# Patient Record
Sex: FEMALE
[De-identification: names, ages, dates, MRNs, and addresses within clinical notes are randomized per-mention and may not be internally consistent; named-entity substitution may affect disease eponyms.]

---

## 2018-01-29 ENCOUNTER — NON-APPOINTMENT (OUTPATIENT)
Age: 68
End: 2018-01-29

## 2018-01-29 ENCOUNTER — APPOINTMENT (OUTPATIENT)
Dept: FAMILY MEDICINE | Facility: CLINIC | Age: 68
End: 2018-01-29
Payer: COMMERCIAL

## 2018-01-29 VITALS
WEIGHT: 200 LBS | DIASTOLIC BLOOD PRESSURE: 86 MMHG | TEMPERATURE: 98.2 F | SYSTOLIC BLOOD PRESSURE: 148 MMHG | HEIGHT: 65 IN | BODY MASS INDEX: 33.32 KG/M2 | HEART RATE: 94 BPM | OXYGEN SATURATION: 97 %

## 2018-01-29 DIAGNOSIS — B18.2 CHRONIC VIRAL HEPATITIS C: ICD-10-CM

## 2018-01-29 DIAGNOSIS — Z23 ENCOUNTER FOR IMMUNIZATION: ICD-10-CM

## 2018-01-29 DIAGNOSIS — Z00.00 ENCOUNTER FOR GENERAL ADULT MEDICAL EXAMINATION W/OUT ABNORMAL FINDINGS: ICD-10-CM

## 2018-01-29 DIAGNOSIS — M89.8X1 OTHER SPECIFIED DISORDERS OF BONE, SHOULDER: ICD-10-CM

## 2018-01-29 DIAGNOSIS — R94.31 ABNORMAL ELECTROCARDIOGRAM [ECG] [EKG]: ICD-10-CM

## 2018-01-29 DIAGNOSIS — Z78.9 OTHER SPECIFIED HEALTH STATUS: ICD-10-CM

## 2018-01-29 DIAGNOSIS — R03.0 ELEVATED BLOOD-PRESSURE READING, W/OUT DIAGNOSIS OF HYPERTENSION: ICD-10-CM

## 2018-01-29 DIAGNOSIS — D05.81 OTHER SPECIFIED TYPE OF CARCINOMA IN SITU OF RIGHT BREAST: ICD-10-CM

## 2018-01-29 PROCEDURE — 99213 OFFICE O/P EST LOW 20 MIN: CPT | Mod: 25

## 2018-01-29 PROCEDURE — 99387 INIT PM E/M NEW PAT 65+ YRS: CPT | Mod: 25

## 2018-01-29 PROCEDURE — 93000 ELECTROCARDIOGRAM COMPLETE: CPT

## 2018-01-29 RX ORDER — LATANOPROST/PF 0.005 %
0.01 DROPS OPHTHALMIC (EYE) DAILY
Refills: 0 | Status: ACTIVE | COMMUNITY
Start: 2018-01-29

## 2018-01-29 RX ORDER — POLYETHYLENE GLYCOL-3350, SODIUM CHLORIDE, POTASSIUM CHLORIDE AND SODIUM BICARBONATE 420; 11.2; 5.72; 1.48 G/438.4G; G/438.4G; G/438.4G; G/438.4G
420 POWDER, FOR SOLUTION ORAL
Qty: 4000 | Refills: 0 | Status: DISCONTINUED | COMMUNITY
Start: 2017-09-14

## 2018-03-21 ENCOUNTER — TRANSCRIPTION ENCOUNTER (OUTPATIENT)
Age: 68
End: 2018-03-21

## 2018-03-22 ENCOUNTER — MOBILE ON CALL (OUTPATIENT)
Age: 68
End: 2018-03-22

## 2018-03-22 DIAGNOSIS — E55.9 VITAMIN D DEFICIENCY, UNSPECIFIED: ICD-10-CM

## 2018-03-22 DIAGNOSIS — R73.03 PREDIABETES.: ICD-10-CM

## 2018-03-22 DIAGNOSIS — E78.5 HYPERLIPIDEMIA, UNSPECIFIED: ICD-10-CM

## 2022-12-13 ENCOUNTER — TRANSCRIPTION ENCOUNTER (OUTPATIENT)
Age: 72
End: 2022-12-13

## 2022-12-13 ENCOUNTER — NON-APPOINTMENT (OUTPATIENT)
Age: 72
End: 2022-12-13

## 2022-12-15 ENCOUNTER — APPOINTMENT (OUTPATIENT)
Dept: OTOLARYNGOLOGY | Facility: CLINIC | Age: 72
End: 2022-12-15
Payer: MEDICARE

## 2022-12-15 ENCOUNTER — NON-APPOINTMENT (OUTPATIENT)
Age: 72
End: 2022-12-15

## 2022-12-15 VITALS
DIASTOLIC BLOOD PRESSURE: 92 MMHG | TEMPERATURE: 97 F | HEIGHT: 65 IN | HEART RATE: 82 BPM | BODY MASS INDEX: 33.32 KG/M2 | WEIGHT: 200 LBS | SYSTOLIC BLOOD PRESSURE: 140 MMHG

## 2022-12-15 DIAGNOSIS — Z86.19 PERSONAL HISTORY OF OTHER INFECTIOUS AND PARASITIC DISEASES: ICD-10-CM

## 2022-12-15 DIAGNOSIS — Z85.3 PERSONAL HISTORY OF MALIGNANT NEOPLASM OF BREAST: ICD-10-CM

## 2022-12-15 DIAGNOSIS — R58 HEMORRHAGE, NOT ELSEWHERE CLASSIFIED: ICD-10-CM

## 2022-12-15 DIAGNOSIS — Z86.79 PERSONAL HISTORY OF OTHER DISEASES OF THE CIRCULATORY SYSTEM: ICD-10-CM

## 2022-12-15 DIAGNOSIS — Z86.39 PERSONAL HISTORY OF OTHER ENDOCRINE, NUTRITIONAL AND METABOLIC DISEASE: ICD-10-CM

## 2022-12-15 PROCEDURE — 99204 OFFICE O/P NEW MOD 45 MIN: CPT

## 2022-12-15 RX ORDER — AMLODIPINE BESYLATE 5 MG/1
5 TABLET ORAL
Refills: 0 | Status: ACTIVE | COMMUNITY

## 2023-01-25 ENCOUNTER — APPOINTMENT (OUTPATIENT)
Dept: OTOLARYNGOLOGY | Facility: CLINIC | Age: 73
End: 2023-01-25
Payer: MEDICARE

## 2023-01-25 VITALS
HEIGHT: 65 IN | BODY MASS INDEX: 32.49 KG/M2 | SYSTOLIC BLOOD PRESSURE: 154 MMHG | DIASTOLIC BLOOD PRESSURE: 87 MMHG | TEMPERATURE: 97.5 F | WEIGHT: 195 LBS

## 2023-01-25 PROBLEM — Z86.39 HISTORY OF HYPERPARATHYROIDISM: Status: RESOLVED | Noted: 2022-12-15 | Resolved: 2023-01-25

## 2023-01-25 PROCEDURE — 99214 OFFICE O/P EST MOD 30 MIN: CPT

## 2023-01-25 NOTE — ASSESSMENT
[FreeTextEntry1] : Ms. COLES is a 71 yo woman with primary hyperparathyroidism and hypercalcemia - Ca 11 and intact .\par She has nocturnal urinary frequency and bone pain in her legs.\par She gives history of significant bleeding after birth of her 1st child and after mastectomy.  Blood transfusion resulted in Hep C infection.  She reports chronic anemia.\par \par I recommended neck exploration and parathyroidectomy.\par We discussed the neck exploration and parathyroidectomy procedure, along with risks benefits and alternatives. Risks include, but are not limited to, bleeding infection, hypocalcemia, persistent hypercalcemia, voice change, vocal fold paresis/paralysis and cervical scar.  Her questions were answered. \par \par --> US Parathyroid\par --> DEXA scan\par --> hematology evaluation\par \par Return after imaging to schedule surgery

## 2023-01-25 NOTE — CONSULT LETTER
[Dear  ___] : Dear  [unfilled], [( Thank you for referring [unfilled] for consultation for _____ )] : Thank you for referring [unfilled] for consultation for [unfilled] [Please see my note below.] : Please see my note below. [Consult Closing:] : Thank you very much for allowing me to participate in the care of this patient.  If you have any questions, please do not hesitate to contact me. [Sincerely,] : Sincerely, [FreeTextEntry2] : Danay Dudley MD\par [address] [place] [address], [address]\par [place] [id number] [FreeTextEntry3] : \par Mattie Herrera MD \par Otolaryngology, Head and Neck Surgery \par \par

## 2023-01-25 NOTE — PHYSICAL EXAM
[Midline] : trachea located in midline position [Normal] : no rashes [FreeTextEntry1] : No hoarseness.  [Laryngoscopy Performed] : laryngoscopy was performed, see procedure section for findings [de-identified] : tonsils cryptic with debris [de-identified] : Carotid pulses 2+ bilateral.

## 2023-01-25 NOTE — HISTORY OF PRESENT ILLNESS
[de-identified] : I was pleased to evaluate Ms. COLES, a 72 year old woman who was referred by Dr. Dudley for hypercalcemia and primary hyperparathyroidism.  \par PMH: HTN, breast CA (s/p right mastectomy 20 years, no RT or chemo), hx hepatitis C\par \par She noticed her calcium levels have been high for a few years. \par She had a swollen painful knee a few months ago. Now she has bone pain in her upper legs.\par She frequently uses the bathroom at night. She feels sluggish sometimes but no fatigue.\par No kidney stones, fractures, abdominal pain, constipation.\par Last DEXA years ago was normal, per her report. \par Limited known family history, but no known hypercalcemia or hyperparathyroidism.\par She has issues with bleeding, she hemorrhaged after delivering her first child. She needed a blood transfusion and later developed Hepatitis C. She had a hematology workup prior to mastectomy 20 years ago. She had issues with bleeding after her mastectomy. Hemoglobin is always low. She had dental bleeding after a procedure as a child also.\par No issues with stopping bleeding if she cuts herself.\par \par \par LABS\par (07/30/2022) -- Ca 11, intact \par (07/29/2022) -- Ca 10.8, Cr 0.87, Alb 4.2, Vit D total 23\par (02/03/2018) -- Ca 10.6, vit D total 14 \par \par \par NUCLEAR MEDICINE PARATHYROID IMAGING (08-) at [facility]\par - COMPARISON:  None.\par * 20 MINUTE EARLY IMAGES: \par There is uniform distribution of radiotracer throughout both thyroid lobes. There is focal radiotracer activity upper pole of the left thyroid lobe. Physiologic radiotracer activity is noted in salivary glands. \par *DELAY THREE HOUR IMAGES: \par There is complete washout of the radiotracer from both thyroid lobes. Delay washout of the focal radiotracer activity is appreciated in upper pole of the left thyroid lobe within the region of the parathyroid bed. There is no abnormal radiotracer activity appreciated within the mediastinum or neck to suggest ectopic functioning parathyroid tissue. \par IMPRESSION: \par Abnormal parathyroid scan. Scintigraphic evidence of a parathyroid adenoma upper pole of the left thyroid lobe.\par (Images were reviewed.) \par \par \par US THYROID U/S (11-) at [facility]\par - COMPARISON:  None\par - RIGHT LOBE: 4.7 x 1.2 x 1.2 cm \par     --- 2 lower pole right cysts.\par - LEFT LOBE: 4.3 x 1.3 x 1.6 cm\par - ISTHMUS: 0.3 cm, with  tiny left mid cyst \par - No suspicious cervical lymph nodes.\par \par

## 2023-01-25 NOTE — PROCEDURE
[de-identified] : \par Indication: hyperparathyroidism\par -Verbal consent was obtained from patient prior to procedure.\par Flexible laryngoscopy was performed via mouth and revealed the following:\par   -- bilateral cryptic tonsils with debris\par   -- Base of tongue was symmetric and not enlarged.\par   -- Vallecula was clear\par   -- Epiglottis, both aryepiglottic folds and both false vocal folds were normal\par   -- Arytenoids both without edema and erythema \par   -- True vocal folds were fully mobile and without lesions. \par   -- Post cricoid area was clear.\par   -- Interarytenoid edema was absent \par \par The patient tolerated the procedure well.\par

## 2023-01-25 NOTE — REVIEW OF SYSTEMS
[Patient Intake Form Reviewed] : Patient intake form was reviewed [As Noted in HPI] : as noted in HPI [Negative] : Genitourinary

## 2023-01-26 NOTE — ASSESSMENT
[FreeTextEntry1] : Ms. AL is a 73 yo woman with primary hyperparathyroidism and hypercalcemia - Ca 11 and intact .\par She has nocturnal urinary frequency and bone pain in her legs.\par She gives history of significant bleeding after birth of her 1st child and after mastectomy.  Blood transfusion resulted in Hep C infection.  She reports chronic anemia.\par \par I recommended neck exploration and parathyroidectomy.\par We discussed the neck exploration and parathyroidectomy procedure, along with risks benefits and alternatives. Risks include, but are not limited to, bleeding infection, hypocalcemia, persistent hypercalcemia, voice change, vocal fold paresis/paralysis and cervical scar.  Her questions were answered. \par Parathyroidectomy OR date has not been scheduled yet but will be done at [facility]. \par Medical evaluation and clearance by JAYASHREE JUNG  would be greatly appreciated.\par Preoperative Hematology evaluation would also be appreciated.\par \par Ms. Al received preoperative instructions and a surgical packet today.  She will contact my office at least 1 month prior to her requested surgical date.

## 2023-01-26 NOTE — HISTORY OF PRESENT ILLNESS
[de-identified] : PMH: HTN, breast CA (s/p right mastectomy 20 years, no RT or chemo), hx hepatitis C\par \par Ms. COLES was seen in f/up for primary hyperparathyroidism.  \par She had new DEXA scan and parathyroid US to confirm localization.\par No change in sx.\par \par INITIAL VISIT 12/15/2022\par Ms. COLES, a 72 year old woman who was referred by Dr. Dudley for hypercalcemia and primary hyperparathyroidism.  \par She noticed her calcium levels have been high for a few years. \par She had a swollen painful knee a few months ago. Now she has bone pain in her upper legs.\par She frequently uses the bathroom at night. She feels sluggish sometimes but no fatigue.\par No kidney stones, fractures, abdominal pain, constipation.\par Last DEXA years ago was normal, per her report. \par Limited known family history, but no known hypercalcemia or hyperparathyroidism.\par She has issues with bleeding, she hemorrhaged after delivering her first child. She needed a blood transfusion and later developed Hepatitis C. She had a hematology workup prior to mastectomy 20 years ago. She had issues with bleeding after her mastectomy. Hemoglobin is always low. She had dental bleeding after a procedure as a child also.\par No issues with stopping bleeding if she cuts herself.\par \par \par LABS\par (07/30/2022) -- Ca 11, intact \par (07/29/2022) -- Ca 10.8, Cr 0.87, Alb 4.2, Vit D total 23\par (02/03/2018) -- Ca 10.6, vit D total 14 \par \par  \par ULTRASOUND THYROID NECK (01-) at [facility]\par - COMPARISON:  Thyroid ultrasound dated November 21, 2022\par - Overall thyroid echotexture and blood flow: Slightly heterogeneous slightly hypervascular thyroid is noted.\par - ISTHMUS:   0.2 cm.\par - RIGHT LOBE:  5.5 x 1.3 x 1.4 cm\par    --- 0.5 x 0.2 x 0.4 cm complex cyst in lower pole, stable\par    --- 0.5 x 0.2 x 0.3 cm complex cyst with echogenic foci suggestive of a colloid cyst in midpole \par - LEFT LOBE: 5.2 x 1.1 x 1.4 cm\par    --- 0.2 x 0.1 x 0.2 cm complex cyst in midpole; measured 0.3 cm on prior exam\par - PARATHYROID:  In the region of the left parathyroid gland, posterior upper/midpole of the left thyroid, a 1.0 x 0.6 x 0.7 cm hypoechoic mass is seen similar to prior exam suggestive of a parathyroid adenoma\par - No suspicious cervical lymph nodes.\par IMPRESSION:\par 1.) Slightly heterogeneous slightly hypervascular thyroid which can be seen in thyroiditis. Correlation with serology is recommended. Benign subcentimeter complex bilateral thyroid cysts for which no further follow-up is required as per ACR BI-RADS category\par 2.) 1 cm hypoechoic nodule posterior to the upper/midpole left thyroid suggestive of a parathyroid lesion/parathyroid adenoma consistent with nuclear medicine parathyroid imaging scan dated August 31, 2022.\par \par \par NUCLEAR MEDICINE PARATHYROID IMAGING (08-) at [facility]\par - COMPARISON:  None.\par * 20 MINUTE EARLY IMAGES: \par There is uniform distribution of radiotracer throughout both thyroid lobes. There is focal radiotracer activity upper pole of the left thyroid lobe. Physiologic radiotracer activity is noted in salivary glands. \par *DELAY THREE HOUR IMAGES: \par There is complete washout of the radiotracer from both thyroid lobes. Delay washout of the focal radiotracer activity is appreciated in upper pole of the left thyroid lobe within the region of the parathyroid bed. There is no abnormal radiotracer activity appreciated within the mediastinum or neck to suggest ectopic functioning parathyroid tissue. \par IMPRESSION: \par Abnormal parathyroid scan. Scintigraphic evidence of a parathyroid adenoma upper pole of the left thyroid lobe.\par \par \par DEXA AXIAL (01-)  at [facility]:\par - Normal bone mass\par  \par \par US THYROID U/S (11-) at [facility]\par - COMPARISON:  None\par - RIGHT LOBE: 4.7 x 1.2 x 1.2 cm \par     --- 2 lower pole right cysts.\par - LEFT LOBE: 4.3 x 1.3 x 1.6 cm\par - ISTHMUS: 0.3 cm, with  tiny left mid cyst \par - No suspicious cervical lymph nodes.\par \par

## 2023-01-26 NOTE — CONSULT LETTER
[Dear  ___] : Dear  [unfilled], [Courtesy Letter:] : I had the pleasure of seeing your patient, [unfilled], in my office today. [Please see my note below.] : Please see my note below. [Consult Closing:] : Thank you very much for allowing me to participate in the care of this patient.  If you have any questions, please do not hesitate to contact me. [Sincerely,] : Sincerely, [FreeTextEntry2] : Danay Dudley MD\par [address] [place] [address], [address]\par [place] [id number] [FreeTextEntry3] : \par Mattie Herrera MD \par Otolaryngology, Head and Neck Surgery \par \par   [___] : [unfilled]

## 2023-07-26 ENCOUNTER — APPOINTMENT (OUTPATIENT)
Dept: OTOLARYNGOLOGY | Facility: CLINIC | Age: 73
End: 2023-07-26
Payer: MEDICARE

## 2023-07-26 VITALS
DIASTOLIC BLOOD PRESSURE: 81 MMHG | WEIGHT: 193.6 LBS | BODY MASS INDEX: 32.26 KG/M2 | HEIGHT: 65 IN | TEMPERATURE: 96.9 F | HEART RATE: 71 BPM | SYSTOLIC BLOOD PRESSURE: 152 MMHG

## 2023-07-26 DIAGNOSIS — Z91.013 ALLERGY TO SEAFOOD: ICD-10-CM

## 2023-07-26 DIAGNOSIS — M89.8X9 OTHER SPECIFIED DISORDERS OF BONE, UNSPECIFIED SITE: ICD-10-CM

## 2023-07-26 DIAGNOSIS — D64.9 ANEMIA, UNSPECIFIED: ICD-10-CM

## 2023-07-26 PROCEDURE — 99214 OFFICE O/P EST MOD 30 MIN: CPT

## 2023-07-27 PROBLEM — M89.8X9 BONE PAIN: Status: ACTIVE | Noted: 2023-01-25

## 2023-07-27 PROBLEM — Z91.013 HISTORY OF ALLERGY TO SHELLFISH: Status: RESOLVED | Noted: 2023-07-27 | Resolved: 2023-07-27

## 2023-07-27 PROBLEM — D64.9 CHRONIC ANEMIA: Status: ACTIVE | Noted: 2023-07-27

## 2023-07-27 NOTE — PHYSICAL EXAM
[Midline] : trachea located in midline position [Normal] : no neck adenopathy [FreeTextEntry1] : No hoarseness.

## 2023-07-27 NOTE — CONSULT LETTER
[Dear  ___] : Dear  [unfilled], [Courtesy Letter:] : I had the pleasure of seeing your patient, [unfilled], in my office today. [Please see my note below.] : Please see my note below. [Consult Closing:] : Thank you very much for allowing me to participate in the care of this patient.  If you have any questions, please do not hesitate to contact me. [Sincerely,] : Sincerely, [FreeTextEntry2] : Danay Dudley MD\par [address] [place] [address], [address]\par [place] [id number] [FreeTextEntry3] : \par Mattie Herrera MD \par Otolaryngology, Head and Neck Surgery \par \par

## 2023-07-27 NOTE — ASSESSMENT
[FreeTextEntry1] : Ms. COLES is a 72 yo woman with primary hyperparathyroidism and hypercalcemia - Ca 10.6-11, intact .\par Imaging localized a likely parathyroid adenoma posterior to upper/midpole of left thyroid lobe.\par She still has nocturnal urinary frequency and bone pain in her legs. The left ankle swelling is rheumatologic.\par Bone density is normal.\par She reports significant bleeding after childbirth and after mastectomy.  She has chronic anemia.\par \par She is scheduled for neck exploration and parathyroidectomy for August 14, 2023, at [facility].\par We discussed the neck exploration and parathyroidectomy procedure, along with risks, benefits and alternatives. Risks include, but are not limited to, bleeding infection, hypocalcemia, persistent hypercalcemia, voice change, vocal fold paresis/paralysis and cervical scar. \par Her questions were answered. \par Medical evaluation and clearance by JAYASHREE JUNG  would be greatly appreciated.\par 
Scalp free of abrasions, defects, masses and swelling/[place](s) - size and tension

## 2023-07-27 NOTE — HISTORY OF PRESENT ILLNESS
[de-identified] : PMH: HTN, HLD, prediabetes, glaucoma, breast CA (s/p right mastectomy 20 years, no RT or chemo), hx hepatitis C\par \par Ms. COLES wanted to have more detailed discussion re surgery for primary hyperparathyroidism, which was rescheduled to 8/14/2023.\par US and nuclear parathyroid imaging localized likely parathyroid adenoma near left upper/mid lobe thyroid.\par Normal bone density on DXA in Jan 2023.\par She still has bone pain in upper legs and has to get up twice/night to urinate.\par New pain/swelling in left ankle joint.\par No kidney stone hx.\par \par INITIAL VISIT 12/15/2022\par Ms. COLES, a 72 year old woman who was referred by Dr. Dudley for hypercalcemia and primary hyperparathyroidism.  \par She noticed her calcium levels have been high for a few years. \par She had a swollen painful knee a few months ago. Now she has bone pain in her upper legs.\par She frequently uses the bathroom at night. She feels sluggish sometimes but no fatigue.\par No kidney stones, fractures, abdominal pain, constipation.\par Last DEXA years ago was normal, per her report. \par Limited known family history, but no known hypercalcemia or hyperparathyroidism.\par She has issues with bleeding, she hemorrhaged after delivering her first child. She needed a blood transfusion and later developed Hepatitis C. She had a hematology workup prior to mastectomy 20 years ago. She had issues with bleeding after her mastectomy. Hemoglobin is always low. She had dental bleeding after a procedure as a child also.\par No issues with stopping bleeding if she cuts herself.\par \par \par LABS\par (07/30/2022) -- Ca 11, intact \par (07/29/2022) -- Ca 10.8, Cr 0.87, Alb 4.2, Vit D total 23\par (02/03/2018) -- Ca 10.6, vit D total 14 \par \par  \par ULTRASOUND THYROID NECK (01-) at [facility]\par - COMPARISON:  Thyroid ultrasound dated November 21, 2022\par - Overall thyroid echotexture and blood flow: Slightly heterogeneous slightly hypervascular thyroid is noted.\par - ISTHMUS:   0.2 cm.\par - RIGHT LOBE:  5.5 x 1.3 x 1.4 cm\par    --- 0.5 x 0.2 x 0.4 cm complex cyst in lower pole, stable\par    --- 0.5 x 0.2 x 0.3 cm complex cyst with echogenic foci suggestive of a colloid cyst in midpole \par - LEFT LOBE: 5.2 x 1.1 x 1.4 cm\par    --- 0.2 x 0.1 x 0.2 cm complex cyst in midpole; measured 0.3 cm on prior exam\par - PARATHYROID:  In the region of the left parathyroid gland, posterior upper/midpole of the left thyroid, a 1.0 x 0.6 x 0.7 cm hypoechoic mass is seen similar to prior exam suggestive of a parathyroid adenoma\par - No suspicious cervical lymph nodes.\par IMPRESSION:\par 1.) Slightly heterogeneous slightly hypervascular thyroid which can be seen in thyroiditis. Correlation with serology is recommended. Benign subcentimeter complex bilateral thyroid cysts for which no further follow-up is required as per ACR BI-RADS category\par 2.) 1 cm hypoechoic nodule posterior to the upper/midpole left thyroid suggestive of a parathyroid lesion/parathyroid adenoma consistent with nuclear medicine parathyroid imaging scan dated August 31, 2022.\par \par \par NUCLEAR MEDICINE PARATHYROID IMAGING (08-) at [facility]\par - COMPARISON:  None.\par * 20 MINUTE EARLY IMAGES: \par There is uniform distribution of radiotracer throughout both thyroid lobes. There is focal radiotracer activity upper pole of the left thyroid lobe. Physiologic radiotracer activity is noted in salivary glands. \par *DELAY THREE HOUR IMAGES: \par There is complete washout of the radiotracer from both thyroid lobes. Delay washout of the focal radiotracer activity is appreciated in upper pole of the left thyroid lobe within the region of the parathyroid bed. There is no abnormal radiotracer activity appreciated within the mediastinum or neck to suggest ectopic functioning parathyroid tissue. \par IMPRESSION: \par Abnormal parathyroid scan. Scintigraphic evidence of a parathyroid adenoma upper pole of the left thyroid lobe.\par \par \par DEXA AXIAL (01-)  at [facility]:\par - Normal bone mass\par  \par \par US THYROID U/S (11-) at [facility]\par - COMPARISON:  None\par - RIGHT LOBE: 4.7 x 1.2 x 1.2 cm \par     --- 2 lower pole right cysts.\par - LEFT LOBE: 4.3 x 1.3 x 1.6 cm\par - ISTHMUS: 0.3 cm, with  tiny left mid cyst \par - No suspicious cervical lymph nodes.\par \par

## 2023-08-14 ENCOUNTER — APPOINTMENT (OUTPATIENT)
Dept: OTOLARYNGOLOGY | Facility: HOSPITAL | Age: 73
End: 2023-08-14

## 2023-08-14 ENCOUNTER — NON-APPOINTMENT (OUTPATIENT)
Age: 73
End: 2023-08-14

## 2023-08-14 ENCOUNTER — OUTPATIENT (OUTPATIENT)
Dept: OUTPATIENT SERVICES | Facility: HOSPITAL | Age: 73
LOS: 1 days | Discharge: HOME | End: 2023-08-14
Payer: MEDICARE

## 2023-08-14 ENCOUNTER — TRANSCRIPTION ENCOUNTER (OUTPATIENT)
Age: 73
End: 2023-08-14

## 2023-08-14 ENCOUNTER — RESULT REVIEW (OUTPATIENT)
Age: 73
End: 2023-08-14

## 2023-08-14 VITALS
HEIGHT: 65 IN | HEART RATE: 68 BPM | TEMPERATURE: 98 F | DIASTOLIC BLOOD PRESSURE: 83 MMHG | OXYGEN SATURATION: 98 % | SYSTOLIC BLOOD PRESSURE: 153 MMHG | WEIGHT: 194.45 LBS | RESPIRATION RATE: 16 BRPM

## 2023-08-14 VITALS — HEART RATE: 56 BPM | OXYGEN SATURATION: 100 % | RESPIRATION RATE: 18 BRPM

## 2023-08-14 DIAGNOSIS — Z90.11 ACQUIRED ABSENCE OF RIGHT BREAST AND NIPPLE: Chronic | ICD-10-CM

## 2023-08-14 PROBLEM — I10 ESSENTIAL (PRIMARY) HYPERTENSION: Chronic | Status: ACTIVE | Noted: 2023-08-11

## 2023-08-14 LAB
CALCIUM SERPL-MCNC: 10.1 MG/DL — SIGNIFICANT CHANGE UP (ref 8.4–10.5)
PTH-INTACT IO % DIF SERPL: 10.4 PG/ML — LOW (ref 15–65)
PTH-INTACT IO % DIF SERPL: 14.5 PG/ML — LOW (ref 15–65)
PTH-INTACT IO % DIF SERPL: 19.1 PG/ML — SIGNIFICANT CHANGE UP (ref 15–65)
PTH-INTACT IO % DIF SERPL: 52.5 PG/ML — SIGNIFICANT CHANGE UP (ref 15–65)
PTH-INTACT IO % DIF SERPL: 52.8 PG/ML — SIGNIFICANT CHANGE UP (ref 15–65)
PTH-INTACT IO % DIF SERPL: 60.5 PG/ML — SIGNIFICANT CHANGE UP (ref 15–65)
PTH-INTACT IO % DIF SERPL: 66 PG/ML — HIGH (ref 15–65)
PTH-INTACT IO % DIF SERPL: 73.1 PG/ML — HIGH (ref 15–65)
PTH-INTACT IO % DIF SERPL: 8 PG/ML — LOW (ref 15–65)
PTH-INTACT IO % DIF SERPL: 87.9 PG/ML — HIGH (ref 15–65)

## 2023-08-14 PROCEDURE — 88305 TISSUE EXAM BY PATHOLOGIST: CPT

## 2023-08-14 PROCEDURE — 36415 COLL VENOUS BLD VENIPUNCTURE: CPT

## 2023-08-14 PROCEDURE — 88331 PATH CONSLTJ SURG 1 BLK 1SPC: CPT

## 2023-08-14 PROCEDURE — 83970 ASSAY OF PARATHORMONE: CPT

## 2023-08-14 PROCEDURE — 60500 EXPLORE PARATHYROID GLANDS: CPT

## 2023-08-14 PROCEDURE — 88305 TISSUE EXAM BY PATHOLOGIST: CPT | Mod: 26

## 2023-08-14 PROCEDURE — 60500 EXPLORE PARATHYROID GLANDS: CPT | Mod: GC

## 2023-08-14 PROCEDURE — 88331 PATH CONSLTJ SURG 1 BLK 1SPC: CPT | Mod: 26

## 2023-08-14 PROCEDURE — 82310 ASSAY OF CALCIUM: CPT

## 2023-08-14 PROCEDURE — C1889: CPT

## 2023-08-14 DEVICE — SURGIFOAM PAD 8CM X 12.5CM X 10MM (100): Type: IMPLANTABLE DEVICE | Status: FUNCTIONAL

## 2023-08-14 DEVICE — CLIP APPLIER ETHICON LIGACLIP 9 3/8" SMALL: Type: IMPLANTABLE DEVICE | Status: FUNCTIONAL

## 2023-08-14 RX ORDER — ACETAMINOPHEN 500 MG
650 TABLET ORAL ONCE
Refills: 0 | Status: COMPLETED | OUTPATIENT
Start: 2023-08-14 | End: 2023-08-14

## 2023-08-14 RX ORDER — MORPHINE SULFATE 50 MG/1
4 CAPSULE, EXTENDED RELEASE ORAL ONCE
Refills: 0 | Status: DISCONTINUED | OUTPATIENT
Start: 2023-08-14 | End: 2023-08-14

## 2023-08-14 RX ORDER — CALCITRIOL 0.5 UG/1
1 CAPSULE ORAL
Qty: 28 | Refills: 0
Start: 2023-08-14 | End: 2023-08-27

## 2023-08-14 RX ORDER — OXYCODONE AND ACETAMINOPHEN 5; 325 MG/1; MG/1
1 TABLET ORAL ONCE
Refills: 0 | Status: DISCONTINUED | OUTPATIENT
Start: 2023-08-14 | End: 2023-08-14

## 2023-08-14 RX ORDER — OXYCODONE AND ACETAMINOPHEN 5; 325 MG/1; MG/1
2 TABLET ORAL ONCE
Refills: 0 | Status: DISCONTINUED | OUTPATIENT
Start: 2023-08-14 | End: 2023-08-14

## 2023-08-14 RX ORDER — SODIUM CHLORIDE 9 MG/ML
1000 INJECTION, SOLUTION INTRAVENOUS
Refills: 0 | Status: DISCONTINUED | OUTPATIENT
Start: 2023-08-14 | End: 2023-08-14

## 2023-08-14 RX ORDER — LATANOPROST 0.05 MG/ML
1 SOLUTION/ DROPS OPHTHALMIC; TOPICAL
Refills: 0 | DISCHARGE

## 2023-08-14 RX ORDER — ONDANSETRON 8 MG/1
4 TABLET, FILM COATED ORAL ONCE
Refills: 0 | Status: DISCONTINUED | OUTPATIENT
Start: 2023-08-14 | End: 2023-08-14

## 2023-08-14 RX ORDER — CALCIUM CARBONATE 500(1250)
1 TABLET ORAL
Qty: 42 | Refills: 0
Start: 2023-08-14 | End: 2023-08-27

## 2023-08-14 RX ORDER — AMLODIPINE BESYLATE 2.5 MG/1
1 TABLET ORAL
Refills: 0 | DISCHARGE

## 2023-08-14 RX ADMIN — Medication 650 MILLIGRAM(S): at 19:02

## 2023-08-14 NOTE — BRIEF OPERATIVE NOTE - OPERATION/FINDINGS
left upper parathyroid 585 mg, right upper parathyroid 181 mg. Asynchronous hyperplasia. Intraop PTH dropped from 87.9 pre-op to 10.4 at end of case. Nerve monitoring continuously throughout case with stimulation of left RLN

## 2023-08-14 NOTE — ASU PREOP CHECKLIST - HEIGHT IN FEET
5 Ear Star Wedge Flap Text: The defect edges were debeveled with a #15 blade scalpel.  Given the location of the defect and the proximity to free margins (helical rim) an ear star wedge flap was deemed most appropriate.  Using a sterile surgical marker, the appropriate flap was drawn incorporating the defect and placing the expected incisions between the helical rim and antihelix where possible.  The area thus outlined was incised through and through with a #15 scalpel blade.

## 2023-08-14 NOTE — ASU DISCHARGE PLAN (ADULT/PEDIATRIC) - NS MD DC FALL RISK RISK
For information on Fall & Injury Prevention, visit: [email or web address] OR  [email or web address] OR  https://www.cdc.gov/steadi/patient.html

## 2023-08-14 NOTE — BRIEF OPERATIVE NOTE - SPECIMENS
left upper parathyroid, left paratracheal mass, right upper parathyroid, right lower portion of parathyroid

## 2023-08-14 NOTE — ASU DISCHARGE PLAN (ADULT/PEDIATRIC) - CARE PROVIDER_API CALL
Mattie Herrera  Otolaryngology  [address]  [place] [address]  Phone: [phone]  Fax: [phone]  Established Patient  Follow Up Time:

## 2023-08-14 NOTE — ASU DISCHARGE PLAN (ADULT/PEDIATRIC) - ASU DC SPECIAL INSTRUCTIONSFT
- Monitor for signs of low calcium - if you have any numbness or tingling of your arms/legs or any numbness around your mouth, call the office and take 1250mg calcium as prescribed  - Take tylenol and motrin alternating with eachother every 3 hours as needed for pain  - Ok to eat a regular diet  - Take medications as prescribed (1250 mg calcium carbonate three times a day and 0.25 mg rocatrol 2 times a day)  - Call the office below for follow up information

## 2023-08-23 ENCOUNTER — APPOINTMENT (OUTPATIENT)
Dept: OTOLARYNGOLOGY | Facility: CLINIC | Age: 73
End: 2023-08-23
Payer: MEDICARE

## 2023-08-23 VITALS
DIASTOLIC BLOOD PRESSURE: 89 MMHG | SYSTOLIC BLOOD PRESSURE: 153 MMHG | WEIGHT: 193 LBS | TEMPERATURE: 97 F | BODY MASS INDEX: 32.15 KG/M2 | HEART RATE: 62 BPM | HEIGHT: 65 IN

## 2023-08-23 DIAGNOSIS — E21.0 PRIMARY HYPERPARATHYROIDISM: ICD-10-CM

## 2023-08-23 DIAGNOSIS — Z86.39 PERSONAL HISTORY OF OTHER ENDOCRINE, NUTRITIONAL AND METABOLIC DISEASE: ICD-10-CM

## 2023-08-23 LAB — SURGICAL PATHOLOGY STUDY: SIGNIFICANT CHANGE UP

## 2023-08-23 PROCEDURE — 99024 POSTOP FOLLOW-UP VISIT: CPT

## 2023-08-23 RX ORDER — ROSUVASTATIN CALCIUM 10 MG/1
10 TABLET, FILM COATED ORAL
Refills: 0 | Status: ACTIVE | COMMUNITY

## 2023-09-04 PROBLEM — Z86.39 HISTORY OF HYPERCALCEMIA: Status: RESOLVED | Noted: 2022-12-15 | Resolved: 2023-09-04

## 2023-09-04 RX ORDER — B-COMPLEX WITH VITAMIN C
1250 (500 CA) TABLET ORAL EVERY 8 HOURS
Refills: 0 | Status: ACTIVE | COMMUNITY

## 2023-09-04 RX ORDER — CALCITRIOL 0.25 UG/1
0.25 CAPSULE, LIQUID FILLED ORAL
Refills: 0 | Status: ACTIVE | COMMUNITY

## 2023-09-04 NOTE — PROCEDURE
[de-identified] :  Transoral flexible laryngoscopy revealed normal vocal fold mobility bilaterally.

## 2023-09-04 NOTE — ASSESSMENT
[FreeTextEntry1] : Ms. COLES is recovering after 3-gland parathyroidectomy for primary hyperparathyroidism on August 14, 2023. No paresthesias. On calcitriol and calcium supplements.  PLAN - massage surgical site and apply emollients.   Silicone gel sheeting may also be effective in minimizing appearance of scar.  - f/up with Dr. Woody-Roula - calcium and intact PTH so that tapering of calcitriol and calcium can be determined. (she will do at [facility]) I will contact her with lab and pathology results.  I would be happy to see her again as needed.

## 2023-09-04 NOTE — PHYSICAL EXAM
[Normal] : normal appearance, well groomed, well nourished, and in no acute distress [FreeTextEntry1] : No hoarseness.  [de-identified] : Dermabond and Steri strips were removed; Monocryl sutures cut at skin.  Surgical site with mild edema.  [Laryngoscopy Performed] : laryngoscopy was performed, see procedure section for findings

## 2023-09-04 NOTE — HISTORY OF PRESENT ILLNESS
[de-identified] : Ms. COLES underwent multigland parathyroidectomy bilateral superior, left inferior) for primary hyperparathyroidism on August 14, 2023, at [facility]. She went home on day of surgery with prescriptions for calcitriol 0.25 mcg BID and calcium carbonate 1250 mg q 8 hours. Operative findings: - IoPTH 87 baseline - Left superior parathyroid removed, 585 mg --> IoPTH settled around 50 - Left inferior parathyroid removed; about 1/3 size of left superior - Right superior parathyroid removed, 181 mg - Portion of right inferior parathyroid removed, 5 mg - IoPTH decreased to 19 --> 14 --> 8 (in PACU)  Today, she reports no numbness/tingling. Mild tenderness at surgical site. Swallowing normally. Voice back to normal.  PATHOLOGY (8/14/2023) - pending

## 2023-09-04 NOTE — CONSULT LETTER
[Dear  ___] : Dear  [unfilled], [Courtesy Letter:] : I had the pleasure of seeing your patient, [unfilled], in my office today. [Please see my note below.] : Please see my note below. [Consult Closing:] : Thank you very much for allowing me to participate in the care of this patient.  If you have any questions, please do not hesitate to contact me. [Sincerely,] : Sincerely, [FreeTextEntry2] : Danay Dudley MD [address] [facility], [address] [place] [id number]  [FreeTextEntry3] :  Mattie Herrera MD  Otolaryngology, Head and Neck Surgery     [___] : [unfilled]

## 2023-09-04 NOTE — ADDENDUM
[FreeTextEntry1] :  PATHOLOGY (8/14/2023) 1.) Left Parathyroid gland (FS): -   Enlarged hypercellular parathyroid, 0.565 gm. 2.) Left lower peritracheal mass (FS): -   Hypercellular parathyroid, intra-thymic. 3.) Right superior parathyroid gland (FS): -   Enlarged mildly cellular parathyroid, 0.181 gm. 4.) Portion of right inferior parathyroid gland (FS): -   Minute fragment of parathyroid tissue, intra-thymic. 5.) Portion of right inferior parathyroid gland #2 (FS): -   Parathyroid tissue, minute fragment, 0.005 gm.

## 2023-09-06 PROBLEM — E78.5 HYPERLIPIDEMIA, UNSPECIFIED: Chronic | Status: ACTIVE | Noted: 2023-08-11

## 2024-11-21 NOTE — ASU PREOP CHECKLIST - VIA
November 21, 2024     Patient: Glenn Sinha   YOB: 2017   Date of Visit: 11/21/2024       To Whom It May Concern:    Glenn Sinha was seen in our clinic on 11/21/2024 at 9:30 am. Please excuse Glenn for her absence from school on this day to make the appointment.    If you have any questions or concerns, please don't hesitate to call.         Sincerely,         ANDREW Silva, RN, On Behalf Of:  Guido Mi MD        CC: No Recipients  
ambulate

## (undated) DEVICE — SPONGE ENDO PEANUT 5MM

## (undated) DEVICE — DRSG STERISTRIPS 0.5 X 4"

## (undated) DEVICE — DRAPE TOWEL BLUE 17" X 24"

## (undated) DEVICE — LIGASURE SMALL JAW

## (undated) DEVICE — RUBBER BANDS STERILE

## (undated) DEVICE — SUT PLAIN GUT FAST ABSORBING 5-0 PC-1

## (undated) DEVICE — SUT SILK 2-0 30" PSL

## (undated) DEVICE — DRSG DERMABOND 0.7ML

## (undated) DEVICE — SUT CHROMIC 4-0 27" SH-1

## (undated) DEVICE — SUT CHROMIC 3-0 27" SH

## (undated) DEVICE — PROBE PRASS SLIM MONOPOLAR STIMULATOR

## (undated) DEVICE — BIPOLAR FORCEP SYMMETRY BAYONET STR 8.25" X 1.5MM (BLUE)

## (undated) DEVICE — SUT SILK 3-0 18" TIES

## (undated) DEVICE — DRSG TEGADERM 4X10"

## (undated) DEVICE — VENODYNE/SCD SLEEVE CALF MEDIUM

## (undated) DEVICE — GLV 6.5 DERMAPRENE ULTRA

## (undated) DEVICE — SUCTION YANKAUER BULBOUS TIP W VENT

## (undated) DEVICE — MARKING PEN W RULER

## (undated) DEVICE — LIGASURE EXACT DISSECTOR

## (undated) DEVICE — WARMING BLANKET UPPER ADULT